# Patient Record
Sex: FEMALE | Race: OTHER | NOT HISPANIC OR LATINO | URBAN - METROPOLITAN AREA
[De-identification: names, ages, dates, MRNs, and addresses within clinical notes are randomized per-mention and may not be internally consistent; named-entity substitution may affect disease eponyms.]

---

## 2021-04-21 NOTE — PATIENT DISCUSSION
LINDSEY/K SICCA, OU - PRESCRIBED ARTIFICIAL TEARS BID - QID, OU AND THE DAILY INTAKE OF OMEGA 3/FATTY ACIDS. CONSIDER OTHER TREATMENT OPTIONS IF SYMPTOMS PERSIST/WORSEN. FOLLOW. hearing aides left at home/Bilateral

## 2022-04-22 NOTE — PATIENT DISCUSSION
General: Retinal tear and detachment warning symptoms reviewed and patient instructed to call immediately if increasing floaters, flashes, or decreasing peripheral vision.

## 2022-12-23 ENCOUNTER — NEW PATIENT (OUTPATIENT)
Dept: URBAN - METROPOLITAN AREA CLINIC 53 | Facility: CLINIC | Age: 57
End: 2022-12-23

## 2022-12-23 PROCEDURE — 92134 CPTRZ OPH DX IMG PST SGM RTA: CPT

## 2022-12-23 PROCEDURE — 92015 DETERMINE REFRACTIVE STATE: CPT

## 2022-12-23 PROCEDURE — 92004 COMPRE OPH EXAM NEW PT 1/>: CPT

## 2022-12-23 ASSESSMENT — VISUAL ACUITY
OS_PH: 20/30
OD_PH: 20/30
OS_GLARE: 20/200
OD_GLARE: 20/100
OS_CC: J4
OD_CC: J7
OD_GLARE: 20/200
OS_GLARE: 20/80
OU_CC: 20/40
OS_CC: 20/40
OU_CC: J4
OD_CC: 20/150

## 2022-12-23 ASSESSMENT — TONOMETRY
OS_IOP_MMHG: 15
OD_IOP_MMHG: 15

## 2024-01-11 ENCOUNTER — COMPREHENSIVE EXAM (OUTPATIENT)
Dept: URBAN - METROPOLITAN AREA CLINIC 53 | Facility: CLINIC | Age: 59
End: 2024-01-11

## 2024-01-11 DIAGNOSIS — H43.813: ICD-10-CM

## 2024-01-11 DIAGNOSIS — H16.223: ICD-10-CM

## 2024-01-11 DIAGNOSIS — H11.31: ICD-10-CM

## 2024-01-11 DIAGNOSIS — E11.9: ICD-10-CM

## 2024-01-11 DIAGNOSIS — H25.813: ICD-10-CM

## 2024-01-11 PROCEDURE — 92015 DETERMINE REFRACTIVE STATE: CPT

## 2024-01-11 PROCEDURE — 99214 OFFICE O/P EST MOD 30 MIN: CPT

## 2024-01-11 PROCEDURE — 92134 CPTRZ OPH DX IMG PST SGM RTA: CPT

## 2024-01-11 RX ORDER — LIFITEGRAST 50 MG/ML
1 SOLUTION/ DROPS OPHTHALMIC TWICE A DAY
Start: 2024-01-11

## 2024-01-11 ASSESSMENT — VISUAL ACUITY
OS_GLARE: 20/40
OS_PH: 20/30
OS_CC: 20/60-2
OU_CC: J3@14"
OD_GLARE: 20/150
OD_CC: 20/150
OS_GLARE: 20/40
OD_PH: 20/100
OD_GLARE: 20/250

## 2024-01-11 ASSESSMENT — KERATOMETRY
OS_K1POWER_DIOPTERS: 44.50
OD_AXISANGLE2_DEGREES: 2
OD_K2POWER_DIOPTERS: 45.50
OS_AXISANGLE2_DEGREES: 176
OD_K1POWER_DIOPTERS: 43.75
OD_AXISANGLE_DEGREES: 092
OS_K2POWER_DIOPTERS: 45.25
OS_AXISANGLE_DEGREES: 086

## 2024-01-11 ASSESSMENT — TONOMETRY
OS_IOP_MMHG: 17
OD_IOP_MMHG: 17

## 2024-02-15 ENCOUNTER — PRE-OP/H&P (OUTPATIENT)
Dept: URBAN - METROPOLITAN AREA CLINIC 53 | Facility: CLINIC | Age: 59
End: 2024-02-15

## 2024-02-15 DIAGNOSIS — H25.813: ICD-10-CM

## 2024-02-15 PROCEDURE — 92134 CPTRZ OPH DX IMG PST SGM RTA: CPT

## 2024-02-15 PROCEDURE — 92136 OPHTHALMIC BIOMETRY: CPT

## 2024-02-15 PROCEDURE — PREOP PRE OP VISIT

## 2024-02-15 RX ORDER — MOXIFLOXACIN OPHTHALMIC 5 MG/ML
1 SOLUTION/ DROPS OPHTHALMIC
Start: 2024-02-15

## 2024-02-15 RX ORDER — PREDNISOLONE ACETATE 10 MG/ML
1 SUSPENSION/ DROPS OPHTHALMIC
Start: 2024-02-15

## 2024-02-15 ASSESSMENT — VISUAL ACUITY
OS_SC: 20/200
OD_PH: 20/100
OD_SC: 20/200
OS_PH: 20/50

## 2024-02-15 ASSESSMENT — KERATOMETRY
OD_K2POWER_DIOPTERS: 43.62
OS_AXISANGLE2_DEGREES: 6
OD_AXISANGLE2_DEGREES: 95
OS_K1POWER_DIOPTERS: 45.00
OS_K2POWER_DIOPTERS: 44.50
OD_K1POWER_DIOPTERS: 44.00
OD_AXISANGLE_DEGREES: 5
OS_AXISANGLE_DEGREES: 096

## 2024-02-15 ASSESSMENT — TONOMETRY
OD_IOP_MMHG: 17
OS_IOP_MMHG: 16

## 2024-02-22 ASSESSMENT — KERATOMETRY
OS_K2POWER_DIOPTERS: 44.50
OS_AXISANGLE_DEGREES: 096
OS_K1POWER_DIOPTERS: 45.00
OD_AXISANGLE_DEGREES: 5
OD_K1POWER_DIOPTERS: 44.00
OD_K2POWER_DIOPTERS: 43.62
OD_AXISANGLE2_DEGREES: 95
OS_AXISANGLE2_DEGREES: 6

## 2024-02-27 ENCOUNTER — POST-OP (OUTPATIENT)
Dept: URBAN - METROPOLITAN AREA CLINIC 53 | Facility: CLINIC | Age: 59
End: 2024-02-27

## 2024-02-27 ENCOUNTER — SURGERY/PROCEDURE (OUTPATIENT)
Dept: URBAN - METROPOLITAN AREA SURGERY 16 | Facility: SURGERY | Age: 59
End: 2024-02-27

## 2024-02-27 DIAGNOSIS — H25.811: ICD-10-CM

## 2024-02-27 DIAGNOSIS — Z96.1: ICD-10-CM

## 2024-02-27 DIAGNOSIS — Z98.41: ICD-10-CM

## 2024-02-27 PROCEDURE — P6698455 NON-COMANAGED ADVANCED PO

## 2024-02-27 PROCEDURE — 66984 XCAPSL CTRC RMVL W/O ECP: CPT

## 2024-02-27 ASSESSMENT — VISUAL ACUITY: OD_SC: 20/40

## 2024-02-27 ASSESSMENT — TONOMETRY: OD_IOP_MMHG: 15

## 2024-03-28 ENCOUNTER — POST OP/EVAL OF SECOND EYE (OUTPATIENT)
Dept: URBAN - METROPOLITAN AREA CLINIC 53 | Facility: CLINIC | Age: 59
End: 2024-03-28

## 2024-03-28 DIAGNOSIS — Z98.41: ICD-10-CM

## 2024-03-28 DIAGNOSIS — H25.812: ICD-10-CM

## 2024-03-28 PROCEDURE — 92136 OPHTHALMIC BIOMETRY: CPT

## 2024-03-28 PROCEDURE — 99213 OFFICE O/P EST LOW 20 MIN: CPT

## 2024-03-28 RX ORDER — PREDNISOLONE ACETATE 10 MG/ML
1 SUSPENSION/ DROPS OPHTHALMIC ONCE A DAY
Start: 2024-03-30

## 2024-03-28 RX ORDER — MOXIFLOXACIN OPHTHALMIC 5 MG/ML
1 SOLUTION/ DROPS OPHTHALMIC ONCE A DAY
Start: 2024-03-30

## 2024-03-28 ASSESSMENT — TONOMETRY
OD_IOP_MMHG: 14
OS_IOP_MMHG: 15

## 2024-03-28 ASSESSMENT — VISUAL ACUITY
OS_CC: 20/20-1
OD_SC: 20/30-2

## 2024-04-02 ENCOUNTER — POST-OP (OUTPATIENT)
Dept: URBAN - METROPOLITAN AREA CLINIC 49 | Facility: LOCATION | Age: 59
End: 2024-04-02

## 2024-04-02 ENCOUNTER — SURGERY/PROCEDURE (OUTPATIENT)
Dept: URBAN - METROPOLITAN AREA SURGERY 16 | Facility: SURGERY | Age: 59
End: 2024-04-02

## 2024-04-02 DIAGNOSIS — Z98.42: ICD-10-CM

## 2024-04-02 DIAGNOSIS — H25.812: ICD-10-CM

## 2024-04-02 PROCEDURE — 66984 XCAPSL CTRC RMVL W/O ECP: CPT | Mod: 79,LT

## 2024-04-02 ASSESSMENT — TONOMETRY: OS_IOP_MMHG: 20

## 2024-04-02 ASSESSMENT — VISUAL ACUITY: OS_SC: 20/200

## 2024-04-11 ENCOUNTER — POST-OP (OUTPATIENT)
Dept: URBAN - METROPOLITAN AREA CLINIC 53 | Facility: CLINIC | Age: 59
End: 2024-04-11

## 2024-04-11 DIAGNOSIS — Z96.1: ICD-10-CM

## 2024-04-11 DIAGNOSIS — Z98.42: ICD-10-CM

## 2024-04-11 ASSESSMENT — KERATOMETRY
OD_AXISANGLE_DEGREES: 084
OD_K2POWER_DIOPTERS: 43.75
OS_AXISANGLE_DEGREES: 071
OS_K2POWER_DIOPTERS: 45.25
OD_K1POWER_DIOPTERS: 43.00
OS_AXISANGLE2_DEGREES: 161
OD_AXISANGLE2_DEGREES: 174
OS_K1POWER_DIOPTERS: 44.50

## 2024-04-11 ASSESSMENT — VISUAL ACUITY
OD_SC: 20/25-2 PUSH
OS_PH: 20/20

## 2024-04-11 ASSESSMENT — TONOMETRY
OS_IOP_MMHG: 10
OD_IOP_MMHG: 10

## 2024-05-02 ENCOUNTER — POST-OP (OUTPATIENT)
Dept: URBAN - METROPOLITAN AREA CLINIC 53 | Facility: CLINIC | Age: 59
End: 2024-05-02

## 2024-05-02 DIAGNOSIS — Z98.41: ICD-10-CM

## 2024-05-02 DIAGNOSIS — Z96.1: ICD-10-CM

## 2024-05-02 DIAGNOSIS — Z98.42: ICD-10-CM

## 2024-05-02 PROCEDURE — 92015 DETERMINE REFRACTIVE STATE: CPT | Mod: NC

## 2024-05-02 ASSESSMENT — KERATOMETRY
OD_AXISANGLE_DEGREES: 084
OS_AXISANGLE_DEGREES: 071
OS_K1POWER_DIOPTERS: 44.50
OD_AXISANGLE2_DEGREES: 174
OS_K2POWER_DIOPTERS: 45.25
OD_K1POWER_DIOPTERS: 43.00
OD_K2POWER_DIOPTERS: 43.75
OS_AXISANGLE2_DEGREES: 161

## 2024-05-02 ASSESSMENT — TONOMETRY
OS_IOP_MMHG: 13
OD_IOP_MMHG: 13

## 2024-05-02 ASSESSMENT — VISUAL ACUITY
OD_PH: 20/25
OS_PH: 20/20-1
OD_SC: 20/60+2
OU_SC: 20/30
OS_SC: 20/30

## 2024-06-13 ENCOUNTER — CONSULTATION/EVALUATION (OUTPATIENT)
Dept: URBAN - METROPOLITAN AREA CLINIC 53 | Facility: CLINIC | Age: 59
End: 2024-06-13

## 2024-06-13 DIAGNOSIS — H02.831: ICD-10-CM

## 2024-06-13 DIAGNOSIS — H02.834: ICD-10-CM

## 2024-06-13 PROCEDURE — 92285 EXTERNAL OCULAR PHOTOGRAPHY: CPT

## 2024-06-13 PROCEDURE — 99213 OFFICE O/P EST LOW 20 MIN: CPT

## 2024-06-13 ASSESSMENT — VISUAL ACUITY
OS_CC: 20/25
OU_CC: 20/25
OD_PH: 20/30
OU_CC: J1+
OD_CC: 20/50+1

## 2024-06-13 ASSESSMENT — KERATOMETRY
OS_AXISANGLE2_DEGREES: 161
OS_K2POWER_DIOPTERS: 45.25
OD_AXISANGLE2_DEGREES: 174
OD_AXISANGLE_DEGREES: 084
OS_AXISANGLE_DEGREES: 071
OD_K1POWER_DIOPTERS: 43.00
OD_K2POWER_DIOPTERS: 43.75
OS_K1POWER_DIOPTERS: 44.50

## 2024-06-13 ASSESSMENT — TONOMETRY
OD_IOP_MMHG: 18
OS_IOP_MMHG: 19

## 2024-06-27 ENCOUNTER — DIAGNOSTICS ONLY (OUTPATIENT)
Dept: URBAN - METROPOLITAN AREA CLINIC 53 | Facility: CLINIC | Age: 59
End: 2024-06-27

## 2024-06-27 DIAGNOSIS — H02.831: ICD-10-CM

## 2024-06-27 DIAGNOSIS — H02.834: ICD-10-CM

## 2024-06-27 PROCEDURE — 92285 EXTERNAL OCULAR PHOTOGRAPHY: CPT

## 2024-06-27 PROCEDURE — 92082 INTERMEDIATE VISUAL FIELD XM: CPT

## 2024-06-27 ASSESSMENT — KERATOMETRY
OS_K2POWER_DIOPTERS: 45.25
OS_K1POWER_DIOPTERS: 44.50
OD_AXISANGLE2_DEGREES: 174
OD_K2POWER_DIOPTERS: 43.75
OS_AXISANGLE_DEGREES: 071
OS_AXISANGLE2_DEGREES: 161
OD_K1POWER_DIOPTERS: 43.00
OD_AXISANGLE_DEGREES: 084